# Patient Record
Sex: MALE | Race: WHITE | Employment: UNEMPLOYED | URBAN - METROPOLITAN AREA
[De-identification: names, ages, dates, MRNs, and addresses within clinical notes are randomized per-mention and may not be internally consistent; named-entity substitution may affect disease eponyms.]

---

## 2017-08-18 ENCOUNTER — TELEPHONE (OUTPATIENT)
Dept: CARDIOLOGY CLINIC | Age: 67
End: 2017-08-18

## 2017-11-01 LAB
ALBUMIN SERPL-MCNC: 4.2 G/DL
ALP BLD-CCNC: 47 U/L
ALT SERPL-CCNC: 22 U/L
ANION GAP SERPL CALCULATED.3IONS-SCNC: NORMAL MMOL/L
AST SERPL-CCNC: 21 U/L
BASOPHILS ABSOLUTE: 0.04 /ΜL
BASOPHILS RELATIVE PERCENT: 1 %
BILIRUB SERPL-MCNC: 0.6 MG/DL (ref 0.1–1.4)
BUN BLDV-MCNC: 20 MG/DL
CALCIUM SERPL-MCNC: 9.5 MG/DL
CHLORIDE BLD-SCNC: 102 MMOL/L
CHOLESTEROL, TOTAL: 196 MG/DL
CHOLESTEROL/HDL RATIO: 4.4
CO2: 27 MMOL/L
CREAT SERPL-MCNC: 0.83 MG/DL
EOSINOPHILS ABSOLUTE: 0.14 /ΜL
EOSINOPHILS RELATIVE PERCENT: 2 %
GFR CALCULATED: NORMAL
GLUCOSE BLD-MCNC: 108 MG/DL
HCT VFR BLD CALC: 44 % (ref 41–53)
HDLC SERPL-MCNC: 45 MG/DL (ref 35–70)
HEMOGLOBIN: 14.7 G/DL (ref 13.5–17.5)
LDL CHOLESTEROL CALCULATED: 103 MG/DL (ref 0–160)
LYMPHOCYTES ABSOLUTE: 1.54 /ΜL
LYMPHOCYTES RELATIVE PERCENT: 21 %
MCH RBC QN AUTO: 31 PG
MCHC RBC AUTO-ENTMCNC: 34 G/DL
MCV RBC AUTO: 92 FL
MONOCYTES ABSOLUTE: 0.48 /ΜL
MONOCYTES RELATIVE PERCENT: 7 %
NEUTROPHILS ABSOLUTE: 5.18 /ΜL
NEUTROPHILS RELATIVE PERCENT: NORMAL %
PLATELET # BLD: 195 K/ΜL
PMV BLD AUTO: NORMAL FL
POTASSIUM SERPL-SCNC: 4.4 MMOL/L
RBC # BLD: 4.8 10^6/ΜL
SODIUM BLD-SCNC: 138 MMOL/L
TOTAL PROTEIN: 6.6
TRIGL SERPL-MCNC: 242 MG/DL
VLDLC SERPL CALC-MCNC: NORMAL MG/DL
WBC # BLD: 7.4 10^3/ML

## 2017-12-19 ENCOUNTER — OFFICE VISIT (OUTPATIENT)
Dept: CARDIOLOGY CLINIC | Age: 67
End: 2017-12-19

## 2017-12-19 VITALS
WEIGHT: 315 LBS | BODY MASS INDEX: 42.66 KG/M2 | SYSTOLIC BLOOD PRESSURE: 134 MMHG | DIASTOLIC BLOOD PRESSURE: 70 MMHG | OXYGEN SATURATION: 96 % | HEART RATE: 69 BPM | HEIGHT: 72 IN

## 2017-12-19 DIAGNOSIS — I10 ESSENTIAL HYPERTENSION: ICD-10-CM

## 2017-12-19 DIAGNOSIS — I48.3 TYPICAL ATRIAL FLUTTER (HCC): Primary | ICD-10-CM

## 2017-12-19 DIAGNOSIS — E78.2 MIXED HYPERLIPIDEMIA: ICD-10-CM

## 2017-12-19 PROCEDURE — 1036F TOBACCO NON-USER: CPT | Performed by: INTERNAL MEDICINE

## 2017-12-19 PROCEDURE — 1123F ACP DISCUSS/DSCN MKR DOCD: CPT | Performed by: INTERNAL MEDICINE

## 2017-12-19 PROCEDURE — 99214 OFFICE O/P EST MOD 30 MIN: CPT | Performed by: INTERNAL MEDICINE

## 2017-12-19 PROCEDURE — G8417 CALC BMI ABV UP PARAM F/U: HCPCS | Performed by: INTERNAL MEDICINE

## 2017-12-19 PROCEDURE — 3017F COLORECTAL CA SCREEN DOC REV: CPT | Performed by: INTERNAL MEDICINE

## 2017-12-19 PROCEDURE — 4040F PNEUMOC VAC/ADMIN/RCVD: CPT | Performed by: INTERNAL MEDICINE

## 2017-12-19 PROCEDURE — G8484 FLU IMMUNIZE NO ADMIN: HCPCS | Performed by: INTERNAL MEDICINE

## 2017-12-19 PROCEDURE — G8427 DOCREV CUR MEDS BY ELIG CLIN: HCPCS | Performed by: INTERNAL MEDICINE

## 2017-12-19 NOTE — LETTER
[address]  [address] [facility]  [address] [address]  Phone: [phone]  Fax: [phone]    [facility] [place], MD        December 19, 2017     [address]  [address] [facility] [place] [address]    Patient: Murphy Celis  MR Number: [id number]  YOB: 1950  Date of Visit: 12/19/2017    Dear Dr. Kumar Row:    Thank you for the request for consultation for Murphy Celis to me for the evaluation. Below are the relevant portions of my assessment and plan of care. [facility] Office Note  12/19/2017     Subjective:  Mr. Rissa Concepcion is here for follow up atrial flutter chronic and persistent treated with rate control and coumadin. Today he reports he feels well overall from a cardiac standpoint. He states he has had vertigo for the past 2 weeks upon awakening in the morning. He gets up and moving and vertigo will resolve. No associated symptoms. He has had some inner ear fluid. He denies CP, SOB,   or syncope. HPI:   Murpyh Celis is a 79 y.o. male who presents for follow up. On 5/7/15 he was here in f/u. He recently had a 2 week event monitor ordered by Dr Alba Noble due to feeling palpitations after ablation. He stated when he saw Dr Alba Noble last he had a couple of episodes but related to eating a heavy meal and increase alcohol . He wore a monitor and on Sept 3 he had 1/2 hr of A fib rate 117 and he was aware in. It lasted about half hr.  He denied chest pain, change in breathing or dizziness. EKG on 2/24/15 showed a flutter. He underwent successful Aflutter ablation on 05/01/2015. He is followed by Dr Alba Noble. Review of Systems:   12 point ROS negative in all areas as listed below except as in [place]  Constitutional, EENT, Cardiovascular, pulmonary, GI, , Musculoskeletal, skin, neurological, hematological, endocrine, Psychiatric    Reviewed past medical history, social, and family history. He stopped smoking 6 months ago. He drinks 2 alcoholic beverages during week and 6-8 over the weekend. Past Medical History:   Diagnosis Date    Arthritis     feet    Atrial flutter (HCC)     Bradley's esophagus     Colon polyp     GERD (gastroesophageal reflux disease)     Hiatal hernia     Hyperlipidemia      Past Surgical History:   Procedure Laterality Date    COLONOSCOPY      x 5 since age 50    COLONOSCOPY  4/9/2012    COLONOSCOPY  09/2017    KNEE CARTILAGE SURGERY      left     OTHER SURGICAL HISTORY  4/9/12    barretts       Objective:   /70   Pulse 69   Ht 6' (1.829 m)   Wt (!) 350 lb (158.8 kg)   SpO2 96%   BMI 47.47 kg/m²      Wt Readings from Last 3 Encounters:   12/19/17 (!) 350 lb (158.8 kg)   11/21/16 (!) 361 lb 3.2 oz (163.8 kg)   05/25/16 (!) 340 lb (154.2 kg)       Physical Exam:  General: No Respiratory distress, appears well developed and well nourished. Eyes:  Sclera nonicteric  Nose/Sinuses:  negative findings: nose shows no deformity, asymmetry, or inflammation, nasal mucosa normal, septum midline with no perforation or bleeding  Back:  no pain to palpation  Joint:  no active joint inflammation  Musculoskeletal:  negative  Skin:  Warm and dry  Neck:  Negative for JVD and Carotid Bruits. Chest:  Clear to auscultation, respiration easy  Cardiovascular:  (S/P ablation 2015) regular R & R S2 normal, no murmur, no rub or thrill.    Extremities:   1+ bilateral leg edema, clubbing, cyanosis,  Pulses: Femoral and pedal pulses are normal.  Neuro: intact    Medications:   Outpatient Encounter Prescriptions as of 12/19/2017   Medication Sig Dispense Refill    diltiazem (CARDIZEM SR) 120 MG SR capsule Take 120 mg by mouth 2 times daily      vitamin D (CHOLECALCIFEROL) 400 UNITS TABS tablet Take 400 Units by mouth daily      metoprolol (LOPRESSOR) 50 MG tablet TAKE ONE-HALF TABLET BY MOUTH TWICE DAILY 30 tablet 5

## 2017-12-19 NOTE — PROGRESS NOTES
[address] Office Note  12/19/2017     Subjective:  Mr. Wayne Zelaya is here for follow up atrial flutter chronic and persistent treated with rate control and coumadin. Today he reports he feels well overall from a cardiac standpoint. He states he has had vertigo for the past 2 weeks upon awakening in the morning. He gets up and moving and vertigo will resolve. No associated symptoms. He has had some inner ear fluid. He denies CP, SOB,   or syncope. HPI:   Jannelle Kocher is a 79 y.o. male who presents for follow up. On 5/7/15 he was here in f/u. He recently had a 2 week event monitor ordered by Dr Delilah Dominguez due to feeling palpitations after ablation. He stated when he saw Dr Delilah Dominguez last he had a couple of episodes but related to eating a heavy meal and increase alcohol . He wore a monitor and on Sept 3 he had 1/2 hr of A fib rate 117 and he was aware in. It lasted about half hr.  He denied chest pain, change in breathing or dizziness. EKG on 2/24/15 showed a flutter. He underwent successful Aflutter ablation on 05/01/2015. He is followed by Dr Delilah Dominguez. Review of Systems:   12 point ROS negative in all areas as listed below except as in [place]  Constitutional, EENT, Cardiovascular, pulmonary, GI, , Musculoskeletal, skin, neurological, hematological, endocrine, Psychiatric    Reviewed past medical history, social, and family history. He stopped smoking 6 months ago. He drinks 2 alcoholic beverages during week and 6-8 over the weekend.       Past Medical History:   Diagnosis Date    Arthritis     feet    Atrial flutter ([address]     Bradley's esophagus     Colon polyp     GERD (gastroesophageal reflux disease)     Hiatal hernia     Hyperlipidemia      Past Surgical History:   Procedure Laterality Date    COLONOSCOPY      x 5 since age 50    COLONOSCOPY  4/9/2012    COLONOSCOPY  09/2017    KNEE CARTILAGE SURGERY      left     OTHER SURGICAL HISTORY  4/9/12    barretts Objective:   /70   Pulse 69   Ht 6' (1.829 m)   Wt (!) 350 lb (158.8 kg)   SpO2 96%   BMI 47.47 kg/m²     Wt Readings from Last 3 Encounters:   12/19/17 (!) 350 lb (158.8 kg)   11/21/16 (!) 361 lb 3.2 oz (163.8 kg)   05/25/16 (!) 340 lb (154.2 kg)       Physical Exam:  General: No Respiratory distress, appears well developed and well nourished. Eyes:  Sclera nonicteric  Nose/Sinuses:  negative findings: nose shows no deformity, asymmetry, or inflammation, nasal mucosa normal, septum midline with no perforation or bleeding  Back:  no pain to palpation  Joint:  no active joint inflammation  Musculoskeletal:  negative  Skin:  Warm and dry  Neck:  Negative for JVD and Carotid Bruits. Chest:  Clear to auscultation, respiration easy  Cardiovascular:  (S/P ablation 2015) regular R & R S2 normal, no murmur, no rub or thrill. Extremities:   1+ bilateral leg edema, clubbing, cyanosis,  Pulses: Femoral and pedal pulses are normal.  Neuro: intact    Medications:   Outpatient Encounter Prescriptions as of 12/19/2017   Medication Sig Dispense Refill    diltiazem (CARDIZEM SR) 120 MG SR capsule Take 120 mg by mouth 2 times daily      vitamin D (CHOLECALCIFEROL) 400 UNITS TABS tablet Take 400 Units by mouth daily      metoprolol (LOPRESSOR) 50 MG tablet TAKE ONE-HALF TABLET BY MOUTH TWICE DAILY 30 tablet 5    warfarin (COUMADIN) 2 MG tablet TAKE THREE AND ONE-HALF TABLETS BY MOUTH EVERY DAY OR AS DIRECTED (Patient taking differently: 7 mg TAKE THREE AND ONE-HALF TABLETS BY MOUTH EVERY DAY OR AS DIRECTED) 360 tablet 1    esomeprazole Magnesium (NEXIUM) 20 MG PACK Take 20 mg by mouth daily. OTC      atorvastatin (LIPITOR) 10 MG tablet Take 1 tablet by mouth daily. 30 tablet 11    Multiple Vitamins-Minerals (THERAPEUTIC MULTIVITAMIN-MINERALS) tablet Take 1 tablet by mouth daily       No facility-administered encounter medications on file as of 12/19/2017.          Lab Data:  CBC: No results for input(s): WBC, HGB, HCT, MCV, PLT in the last 72 hours. BMP: No results for input(s): NA, K, CL, CO2, PHOS, BUN, CREATININE in the last 72 hours. Invalid input(s): CA  LIVER PROFILE: No results for input(s): AST, ALT, LIPASE, BILIDIR, BILITOT, ALKPHOS in the last 72 hours. Invalid input(s): AMYLASE,  ALB  LIPID:   TC   11/1/17   196  Lab Results   Component Value Date    TRIG 242 11/01/2017    TRIG 250 (H) 04/29/2013     Lab Results   Component Value Date    HDL 45 11/01/2017    HDL 48 04/29/2013     Lab Results   Component Value Date    LDLCALC 103 11/01/2017    LDLCALC 117 (H) 04/29/2013     Lab Results   Component Value Date    LABVLDL 50 04/29/2013     PT/INR: No results for input(s): PROTIME, INR in the last 72 hours. A1C:   No results found for: LABA1C  BNP:  No results for input(s): BNP in the last 72 hours. IMAGING:     Assessment:  Atrial Fibrillation  Event recorder showed 30 min A fib on Sept 3  He is on coumadin. Monitored by PCP. S/p ablation of atrialflutter  Hypertension   /70   Pulse 69   Ht 6' (1.829 m)   Wt (!) 350 lb (158.8 kg)   SpO2 96%   BMI 47.47 kg/m²     Hyperlipidemia Last lipids 11/1/17TC 196   HDL 45     Plan:  1. Encourage daily activity and healthy eating. We talked about calorie restriction and weight management. He is not motivated to exercise. 2.  Continue current medication  3. Follow up with me in 1 year      QUALITY MEASURES  1. Tobacco Cessation Counseling: NA  2. Retake of BP if >140/90:   NA  3. Documentation to PCP/referring for new patient:  Sent to PCP at close of office visit  4. CAD patient on anti-platelet: NA  5. CAD patient on STATIN therapy:  Yes  6.  Patient with CHF and aFib on anticoagulation:  Yes     Monthly INR being monitored by Dr Mira Santiago from [address] [place] [address]    Dr. Darryl Fabian MD 12/19/2017 3:36 PM

## 2017-12-19 NOTE — COMMUNICATION BODY
[address] Office Note  12/19/2017     Subjective:  Mr. Eleanor French is here for follow up atrial flutter chronic and persistent treated with rate control and coumadin. Today he reports he feels well overall from a cardiac standpoint. He states he has had vertigo for the past 2 weeks upon awakening in the morning. He gets up and moving and vertigo will resolve. No associated symptoms. He has had some inner ear fluid. He denies CP, SOB,   or syncope. HPI:   Harris Gomes is a 79 y.o. male who presents for follow up. On 5/7/15 he was here in f/u. He recently had a 2 week event monitor ordered by Dr Thad Chirinos due to feeling palpitations after ablation. He stated when he saw Dr Thad Chirinos last he had a couple of episodes but related to eating a heavy meal and increase alcohol . He wore a monitor and on Sept 3 he had 1/2 hr of A fib rate 117 and he was aware in. It lasted about half hr.  He denied chest pain, change in breathing or dizziness. EKG on 2/24/15 showed a flutter. He underwent successful Aflutter ablation on 05/01/2015. He is followed by Dr Tahd Chirinos. Review of Systems:   12 point ROS negative in all areas as listed below except as in [place]  Constitutional, EENT, Cardiovascular, pulmonary, GI, , Musculoskeletal, skin, neurological, hematological, endocrine, Psychiatric    Reviewed past medical history, social, and family history. He stopped smoking 6 months ago. He drinks 2 alcoholic beverages during week and 6-8 over the weekend.       Past Medical History:   Diagnosis Date    Arthritis     feet    Atrial flutter ([address]     Bradley's esophagus     Colon polyp     GERD (gastroesophageal reflux disease)     Hiatal hernia     Hyperlipidemia      Past Surgical History:   Procedure Laterality Date    COLONOSCOPY      x 5 since age 50    COLONOSCOPY  4/9/2012    COLONOSCOPY  09/2017    KNEE CARTILAGE SURGERY      left     OTHER SURGICAL HISTORY  4/9/12    barretts

## 2018-11-15 ENCOUNTER — OFFICE VISIT (OUTPATIENT)
Dept: CARDIOLOGY CLINIC | Age: 68
End: 2018-11-15
Payer: MEDICARE

## 2018-11-15 VITALS
OXYGEN SATURATION: 95 % | WEIGHT: 315 LBS | HEIGHT: 72 IN | SYSTOLIC BLOOD PRESSURE: 112 MMHG | HEART RATE: 65 BPM | BODY MASS INDEX: 42.66 KG/M2 | DIASTOLIC BLOOD PRESSURE: 68 MMHG

## 2018-11-15 DIAGNOSIS — I48.3 TYPICAL ATRIAL FLUTTER (HCC): ICD-10-CM

## 2018-11-15 DIAGNOSIS — I10 ESSENTIAL HYPERTENSION: Primary | ICD-10-CM

## 2018-11-15 DIAGNOSIS — E78.2 MIXED HYPERLIPIDEMIA: ICD-10-CM

## 2018-11-15 PROCEDURE — G8427 DOCREV CUR MEDS BY ELIG CLIN: HCPCS | Performed by: INTERNAL MEDICINE

## 2018-11-15 PROCEDURE — 4040F PNEUMOC VAC/ADMIN/RCVD: CPT | Performed by: INTERNAL MEDICINE

## 2018-11-15 PROCEDURE — 1123F ACP DISCUSS/DSCN MKR DOCD: CPT | Performed by: INTERNAL MEDICINE

## 2018-11-15 PROCEDURE — 1036F TOBACCO NON-USER: CPT | Performed by: INTERNAL MEDICINE

## 2018-11-15 PROCEDURE — G8417 CALC BMI ABV UP PARAM F/U: HCPCS | Performed by: INTERNAL MEDICINE

## 2018-11-15 PROCEDURE — 1101F PT FALLS ASSESS-DOCD LE1/YR: CPT | Performed by: INTERNAL MEDICINE

## 2018-11-15 PROCEDURE — 3017F COLORECTAL CA SCREEN DOC REV: CPT | Performed by: INTERNAL MEDICINE

## 2018-11-15 PROCEDURE — 99214 OFFICE O/P EST MOD 30 MIN: CPT | Performed by: INTERNAL MEDICINE

## 2018-11-15 PROCEDURE — G8484 FLU IMMUNIZE NO ADMIN: HCPCS | Performed by: INTERNAL MEDICINE

## 2018-11-15 NOTE — PROGRESS NOTES
Data:  CBC: No results for input(s): WBC, HGB, HCT, MCV, PLT in the last 72 hours. BMP: No results for input(s): NA, K, CL, CO2, PHOS, BUN, CREATININE in the last 72 hours. Invalid input(s): CA  LIVER PROFILE: No results for input(s): AST, ALT, LIPASE, BILIDIR, BILITOT, ALKPHOS in the last 72 hours. Invalid input(s): AMYLASE,  ALB  LIPID:   TC   11/1/17   196  Lab Results   Component Value Date    TRIG 242 11/01/2017    TRIG 250 (H) 04/29/2013     Lab Results   Component Value Date    HDL 45 11/01/2017    HDL 48 04/29/2013     Lab Results   Component Value Date    LDLCALC 103 11/01/2017    LDLCALC 117 (H) 04/29/2013     Lab Results   Component Value Date    LABVLDL 50 04/29/2013     PT/INR: No results for input(s): PROTIME, INR in the last 72 hours. A1C:   No results found for: LABA1C  BNP:  No results for input(s): BNP in the last 72 hours. IMAGING:     Assessment:  Atrial Fibrillation by History currently regular  He is on coumadin. Monitored by PCP Dr Liyah Thomason. S/p ablation of atrialflutter  Hypertension   /68   Pulse 65   Ht 6' (1.829 m)   Wt (!) 351 lb (159.2 kg)   SpO2 95%   BMI 47.60 kg/m²     Hyperlipidemia Last lipids 8/2017 reviewed in care everywhere  All recent labs reviewed from August 2018 in [facility]:  1. Encourage daily activity and healthy eating. We talked about calorie restriction and weight management. He is not motivated to exercise. 2.  Continue current medication all meds reviewed and refilled as warranted  3. Follow up with me in 1 year  His weight is due to excessive calorie intake mainly alcohol. QUALITY MEASURES  1. Tobacco Cessation Counseling: NA  2. Retake of BP if >140/90:   NA  3. Documentation to PCP/referring for new patient:  Sent to PCP at close of office visit  4. CAD patient on anti-platelet: NA  5. CAD patient on STATIN therapy:  Yes  6.  Patient with CHF and aFib on anticoagulation:  Yes     Monthly INR being monitored by Dr Liyah Thomason from

## 2019-11-12 ENCOUNTER — OFFICE VISIT (OUTPATIENT)
Dept: CARDIOLOGY CLINIC | Age: 69
End: 2019-11-12
Payer: MEDICARE

## 2019-11-12 VITALS
DIASTOLIC BLOOD PRESSURE: 66 MMHG | SYSTOLIC BLOOD PRESSURE: 124 MMHG | OXYGEN SATURATION: 95 % | HEART RATE: 69 BPM | BODY MASS INDEX: 42.66 KG/M2 | WEIGHT: 315 LBS | HEIGHT: 72 IN

## 2019-11-12 DIAGNOSIS — I48.0 PAF (PAROXYSMAL ATRIAL FIBRILLATION) (HCC): Primary | ICD-10-CM

## 2019-11-12 DIAGNOSIS — I10 ESSENTIAL HYPERTENSION: ICD-10-CM

## 2019-11-12 DIAGNOSIS — I48.3 TYPICAL ATRIAL FLUTTER (HCC): ICD-10-CM

## 2019-11-12 DIAGNOSIS — E78.2 MIXED HYPERLIPIDEMIA: ICD-10-CM

## 2019-11-12 PROCEDURE — 99214 OFFICE O/P EST MOD 30 MIN: CPT | Performed by: INTERNAL MEDICINE

## 2019-11-12 PROCEDURE — G8427 DOCREV CUR MEDS BY ELIG CLIN: HCPCS | Performed by: INTERNAL MEDICINE

## 2019-11-12 PROCEDURE — 1036F TOBACCO NON-USER: CPT | Performed by: INTERNAL MEDICINE

## 2019-11-12 PROCEDURE — 3017F COLORECTAL CA SCREEN DOC REV: CPT | Performed by: INTERNAL MEDICINE

## 2019-11-12 PROCEDURE — 4040F PNEUMOC VAC/ADMIN/RCVD: CPT | Performed by: INTERNAL MEDICINE

## 2019-11-12 PROCEDURE — 1123F ACP DISCUSS/DSCN MKR DOCD: CPT | Performed by: INTERNAL MEDICINE

## 2019-11-12 PROCEDURE — G8417 CALC BMI ABV UP PARAM F/U: HCPCS | Performed by: INTERNAL MEDICINE

## 2019-11-12 PROCEDURE — G8484 FLU IMMUNIZE NO ADMIN: HCPCS | Performed by: INTERNAL MEDICINE

## 2020-01-17 ENCOUNTER — TELEPHONE (OUTPATIENT)
Dept: CARDIOLOGY CLINIC | Age: 70
End: 2020-01-17

## 2020-11-23 ENCOUNTER — OFFICE VISIT (OUTPATIENT)
Dept: CARDIOLOGY CLINIC | Age: 70
End: 2020-11-23
Payer: MEDICARE

## 2020-11-23 VITALS
SYSTOLIC BLOOD PRESSURE: 130 MMHG | HEART RATE: 67 BPM | TEMPERATURE: 97.1 F | DIASTOLIC BLOOD PRESSURE: 70 MMHG | HEIGHT: 73 IN | WEIGHT: 315 LBS | BODY MASS INDEX: 41.75 KG/M2 | OXYGEN SATURATION: 97 %

## 2020-11-23 PROCEDURE — 3017F COLORECTAL CA SCREEN DOC REV: CPT | Performed by: INTERNAL MEDICINE

## 2020-11-23 PROCEDURE — G8427 DOCREV CUR MEDS BY ELIG CLIN: HCPCS | Performed by: INTERNAL MEDICINE

## 2020-11-23 PROCEDURE — 1036F TOBACCO NON-USER: CPT | Performed by: INTERNAL MEDICINE

## 2020-11-23 PROCEDURE — 99214 OFFICE O/P EST MOD 30 MIN: CPT | Performed by: INTERNAL MEDICINE

## 2020-11-23 PROCEDURE — 4040F PNEUMOC VAC/ADMIN/RCVD: CPT | Performed by: INTERNAL MEDICINE

## 2020-11-23 PROCEDURE — G8417 CALC BMI ABV UP PARAM F/U: HCPCS | Performed by: INTERNAL MEDICINE

## 2020-11-23 PROCEDURE — G8484 FLU IMMUNIZE NO ADMIN: HCPCS | Performed by: INTERNAL MEDICINE

## 2020-11-23 PROCEDURE — 1123F ACP DISCUSS/DSCN MKR DOCD: CPT | Performed by: INTERNAL MEDICINE

## 2020-11-23 RX ORDER — VITAMIN E 268 MG
400 CAPSULE ORAL DAILY
COMMUNITY

## 2020-11-23 NOTE — PATIENT INSTRUCTIONS
Plan:  1. Meds reviewed. Refills with PCP per pt request.   2. No changes today. Continue current meds   3.  Follow up with me in 1 year

## 2020-11-23 NOTE — PROGRESS NOTES
[address] Office Note  2020     Subjective:  Mr. oJcelin Esposito is here for follow up atrial flutter s/p ablation,  parox afib. on  coumadin. HPI:   Today he denies chest pain, shortness of breath, edema, dizziness, palpitations and syncope. He is exercising daily riding bike 30 minutes daily without complication or limiting factors. PMH:  Zion Adkins is a 79 y.o. male who presents for follow up. On 5/7/15 he was here in f/u. He recently had a 2 week event monitor ordered by Dr Alexandro Cruz due to feeling palpitations after ablation. He stated when he saw Dr Alexandro Cruz last he had a couple of episodes but related to eating a heavy meal and increase alcohol . He wore a monitor and on Sept 3 he had 1/2 hr of A fib rate 117 and he was aware in. It lasted about half hr.  He denied chest pain, change in breathing or dizziness. EKG on 2/24/15 showed a flutter. He underwent successful Aflutter ablation on 2015. He is followed by Dr Alexandro Cruz. Review of Systems:   12 point ROS negative in all areas as listed below except as in [place]  Constitutional, EENT, pulmonary, GI, , Musculoskeletal, skin, neurological, hematological, endocrine, Psychiatric    Reviewed past medical history, social, and family history. He stopped smoking 2017. He drinks 2 alcoholic beverages few shots 3-4 nights per week maybe more over the weekend.     Mom smoker  age 76 CHF  Dad  Smoker  age 67 esophageal cancer, aspirated  Past Medical History:   Diagnosis Date    Arthritis     feet    Atrial flutter ([address]     Bradley's esophagus     Colon polyp     GERD (gastroesophageal reflux disease)     Hiatal hernia     Hyperlipidemia      Past Surgical History:   Procedure Laterality Date    COLONOSCOPY      x 5 since age 50    COLONOSCOPY  2012    COLONOSCOPY  2017    KNEE CARTILAGE SURGERY      left     OTHER SURGICAL HISTORY  12    barretts       Objective:   /70   Pulse 67   Temp 97.1 °F (36.2 °C)   Ht 6' 1\" (1.854 m)   Wt (!) 330 lb 1.9 oz (149.7 kg)   SpO2 97%   BMI 43.55 kg/m²     Wt Readings from Last 3 Encounters:   11/23/20 (!) 330 lb 1.9 oz (149.7 kg)   11/12/19 (!) 334 lb (151.5 kg)   11/15/18 (!) 351 lb (159.2 kg)       Physical Exam:  General: No Respiratory distress, appears well developed and well nourished. Eyes:  Sclera nonicteric  Nose/Sinuses:  negative findings: nose shows no deformity, asymmetry, or inflammation, nasal mucosa normal, septum midline with no perforation or bleeding  Back:  no pain to palpation  Joint:  no active joint inflammation  Musculoskeletal:  negative  Skin:  Warm and dry  Neck:  Negative for JVD and Carotid Bruits. Chest:  Clear to auscultation, respiration easy  Cardiovascular:  HR 64 bpm, (S/P ablation 2015) regular R & R S2 normal, no murmur, no rub or thrill. Extremities:  1+ BLE edema,  No clubbing, cyanosis,  Pulses:  pedal pulses are normal.  Neuro: intact    Medications:   Outpatient Encounter Medications as of 11/23/2020   Medication Sig Dispense Refill    vitamin E 400 UNIT capsule Take 400 Units by mouth daily      Inulin (FIBER CHOICE PO) Take by mouth daily      diltiazem (CARDIZEM SR) 120 MG SR capsule Take 120 mg by mouth 2 times daily      vitamin D (CHOLECALCIFEROL) 400 UNITS TABS tablet Take 400 Units by mouth daily      metoprolol (LOPRESSOR) 50 MG tablet TAKE ONE-HALF TABLET BY MOUTH TWICE DAILY 30 tablet 5    warfarin (COUMADIN) 2 MG tablet TAKE THREE AND ONE-HALF TABLETS BY MOUTH EVERY DAY OR AS DIRECTED (Patient taking differently: 7 mg TAKE THREE AND ONE-HALF TABLETS BY MOUTH EVERY DAY OR AS DIRECTED) 360 tablet 1    esomeprazole Magnesium (NEXIUM) 20 MG PACK Take 20 mg by mouth 2 times daily       atorvastatin (LIPITOR) 10 MG tablet Take 1 tablet by mouth daily. 30 tablet 11     No facility-administered encounter medications on file as of 11/23/2020.          Lab Data:  Recent Results noted in care everywhere  Lab Results   Component Value Date    TRIG 242 11/01/2017    TRIG 250 (H) 04/29/2013     Lab Results   Component Value Date    HDL 45 11/01/2017    HDL 48 04/29/2013     Lab Results   Component Value Date    LDLCALC 103 11/01/2017    LDLCALC 117 (H) 04/29/2013     IMAGING:     Assessment:  Encounter Diagnoses   Name Primary?  Typical atrial flutter (HCC) Yes    Essential hypertension        Atrial Fibrillation by History currently regular  He is on coumadin. Monitored by PCP Dr Rasheed Armenta. S/p ablation of atrialflutter  /70   Pulse 67   Temp 97.1 °F (36.2 °C)   Ht 6' 1\" (1.854 m)   Wt (!) 330 lb 1.9 oz (149.7 kg)   SpO2 97%   BMI 43.55 kg/m²     Hyperlipidemia Last lipids 7/8/20 reviewed in care everywhere , , HDL 42, LDL 88    Plan:  1. Meds reviewed. Refills with PCP per pt request.   2. No changes today. Continue current meds   3. Follow up with me in 1 year   4. I reviewed labs in epic chart. Look satisfactory. This note was scribed in the presence of Shavonne Bradshaw MD by Rubin Huerta RN      QUALITY MEASURES  1. Tobacco Cessation Counseling: NA  2. Retake of BP if >140/90:   NA  3. Documentation to PCP/referring for new patient:  Sent to PCP at close of office visit  4. CAD patient on anti-platelet: NA  5. CAD patient on STATIN therapy:  Yes  6. Patient with CHF and aFib on anticoagulation:  Yes     Monthly INR being monitored by Dr Rasheed Armenta from [facility], Dr. Emeka Johnson, personally performed the services described in this documentation, as scribed by the above signed scribe in my presence. It is both accurate and complete to my knowledge. I agree with the details independently gathered by the clinical support staff, while the remaining scribed note accurately describes my personal service to the patient.           Dr. Emeka Johnson MD 11/23/2020 2:00 PM

## 2021-11-18 NOTE — PROGRESS NOTES
[address] Office Note  2021     Subjective:  Mr. Desire Araujo is here for follow up atrial flutter s/p ablation,  parox afib. on  coumadin. HPI:   Today, he reports he is having more problems with hiatal hernia. He describes it as a squiggly feeling in his chest.    Patient denies chest pain, sob, palpitations, dizziness or syncope. Patient states he is taking all medications as prescribed. Patient states he has received both COVID vaccines and the booster- Clarene Liliane        PMH:  Rafael Lea is a 70 y.o. male who presents for follow up. On 5/7/15 he was here in f/u. He recently had a 2 week event monitor ordered by Dr Dionna De Los Santos due to feeling palpitations after ablation. He stated when he saw Dr Dionna De Los Santos last he had a couple of episodes but related to eating a heavy meal and increase alcohol . He wore a monitor and on Sept 3 he had 1/2 hr of A fib rate 117 and he was aware in. It lasted about half hr.  He denied chest pain, change in breathing or dizziness. EKG on 2/24/15 showed a flutter. He underwent successful Aflutter ablation on 2015. He is followed by Dr Dionna De Los Santos. Review of Systems:   12 point ROS negative in all areas as listed below except as in [place]  Constitutional, EENT, pulmonary, GI, , Musculoskeletal, skin, neurological, hematological, endocrine, Psychiatric    Reviewed past medical history, social, and family history. He stopped smoking 2017. He drinks 2 alcoholic beverages few shots 3-4 nights per week maybe more over the weekend.     Mom smoker  age 76 CHF  Dad  Smoker  age 67 esophageal cancer, aspirated  Past Medical History:   Diagnosis Date    Arthritis     feet    Atrial flutter ([address]     Bradley's esophagus     Colon polyp     GERD (gastroesophageal reflux disease)     Hiatal hernia     Hyperlipidemia      Past Surgical History:   Procedure Laterality Date    COLONOSCOPY      x 5 since age 50    COLONOSCOPY  2012    COLONOSCOPY 09/2017    KNEE CARTILAGE SURGERY      left     OTHER SURGICAL HISTORY  4/9/12    stormy       Objective:   /62   Pulse 56   Ht 6' 1\" (1.854 m)   Wt (!) 342 lb (155.1 kg)   SpO2 (!) 67%   BMI 45.12 kg/m²     Wt Readings from Last 3 Encounters:   11/22/21 (!) 342 lb (155.1 kg)   11/23/20 (!) 330 lb 1.9 oz (149.7 kg)   11/12/19 (!) 334 lb (151.5 kg)       Physical Exam:  General: No Respiratory distress, appears well developed and well nourished. Eyes:  Sclera nonicteric  Nose/Sinuses:  negative findings: nose shows no deformity, asymmetry, or inflammation, nasal mucosa normal, septum midline with no perforation or bleeding  Back:  no pain to palpation  Joint:  no active joint inflammation  Musculoskeletal:  negative  Skin:  Warm and dry  Neck:  Negative for JVD and Carotid Bruits. Chest:  Clear to auscultation, respiration easy  Cardiovascular:  (S/P ablation 2015) regular R & R S2 normal, no murmur, no rub or thrill.    Extremities:  No edema,  No clubbing, cyanosis,  Pulses:  pedal pulses are normal.  Neuro: intact    Medications:   Outpatient Encounter Medications as of 11/22/2021   Medication Sig Dispense Refill    finasteride (PROSCAR) 5 MG tablet Take 5 mg by mouth daily      tamsulosin (FLOMAX) 0.4 MG capsule TAKE 1 CAPSULE EVERY DAY      Inulin (FIBER CHOICE PO) Take by mouth daily      diltiazem (CARDIZEM SR) 120 MG SR capsule Take 120 mg by mouth 2 times daily      vitamin D (CHOLECALCIFEROL) 400 UNITS TABS tablet Take 400 Units by mouth daily      metoprolol (LOPRESSOR) 50 MG tablet TAKE ONE-HALF TABLET BY MOUTH TWICE DAILY 30 tablet 5    warfarin (COUMADIN) 2 MG tablet TAKE THREE AND ONE-HALF TABLETS BY MOUTH EVERY DAY OR AS DIRECTED (Patient taking differently: 7 mg TAKE THREE AND ONE-HALF TABLETS BY MOUTH EVERY DAY OR AS DIRECTED) 360 tablet 1    esomeprazole Magnesium (NEXIUM) 20 MG PACK Take 20 mg by mouth 2 times daily       atorvastatin (LIPITOR) 10 MG tablet Take 1 tablet by mouth daily. 30 tablet 11    vitamin E 400 UNIT capsule Take 400 Units by mouth daily (Patient not taking: Reported on 11/22/2021)       No facility-administered encounter medications on file as of 11/22/2021. Lab Data:  Recent Results noted in care everywhere  Lab Results   Component Value Date    TRIG 242 11/01/2017    TRIG 250 (H) 04/29/2013     Lab Results   Component Value Date    HDL 45 11/01/2017    HDL 48 04/29/2013     Lab Results   Component Value Date    LDLCALC 103 11/01/2017    LDLCALC 117 (H) 04/29/2013     IMAGING:     Assessment:  Encounter Diagnoses   Name Primary?  Typical atrial flutter (HCC)     Mixed hyperlipidemia     Primary hypertension     Paroxysmal atrial fibrillation (HCC) Yes       Atrial Fibrillation by History currently regular  He is on coumadin. Monitored by PCP at [facility] . S/p ablation of atrialflutter  /62   Pulse 56   Ht 6' 1\" (1.854 m)   Wt (!) 342 lb (155.1 kg)   SpO2 (!) 67%   BMI 45.12 kg/m²     Hyperlipidemia Last lipids 7/2/2021 reviewed in care everywhere ,  CHOL 167, HDL 52, LDL 93,   LABS: TSH 1.99, H/H 13.6/42.8, , K+ 4.1, BUN 15, Cr 0.88, AST 16, ALT 17    Plan:  1. Meds reviewed. Refills with PCP per pt request.   2. Recommend to get new blood pressure cuff ([place])   -buy one with a large cuff, should cover 80% of arms  3. Check heart rate  4. Follow up in 1 year        QUALITY MEASURES  1. Tobacco Cessation Counseling: NA  2. Retake of BP if >140/90:   NA  3. Documentation to PCP/referring for new patient:  Sent to PCP at close of office visit  4. CAD patient on anti-platelet: NA  5. CAD patient on STATIN therapy:  Yes  6. Patient with CHF and aFib on anticoagulation:  Yes     Monthly INR being monitored by  from [facility]    This note was scribed in the presence of Stephanie Saleem MD by Zo Amaro RN.      I, Dr. Melida Mohan, personally performed the services described in this documentation, as scribed by the above

## 2021-11-22 ENCOUNTER — OFFICE VISIT (OUTPATIENT)
Dept: CARDIOLOGY CLINIC | Age: 71
End: 2021-11-22
Payer: MEDICARE

## 2021-11-22 VITALS
HEART RATE: 56 BPM | WEIGHT: 315 LBS | DIASTOLIC BLOOD PRESSURE: 62 MMHG | HEIGHT: 73 IN | OXYGEN SATURATION: 67 % | BODY MASS INDEX: 41.75 KG/M2 | SYSTOLIC BLOOD PRESSURE: 136 MMHG

## 2021-11-22 DIAGNOSIS — I48.3 TYPICAL ATRIAL FLUTTER (HCC): ICD-10-CM

## 2021-11-22 DIAGNOSIS — I48.0 PAROXYSMAL ATRIAL FIBRILLATION (HCC): Primary | ICD-10-CM

## 2021-11-22 DIAGNOSIS — E78.2 MIXED HYPERLIPIDEMIA: ICD-10-CM

## 2021-11-22 DIAGNOSIS — I10 PRIMARY HYPERTENSION: ICD-10-CM

## 2021-11-22 PROCEDURE — 3017F COLORECTAL CA SCREEN DOC REV: CPT | Performed by: INTERNAL MEDICINE

## 2021-11-22 PROCEDURE — 4040F PNEUMOC VAC/ADMIN/RCVD: CPT | Performed by: INTERNAL MEDICINE

## 2021-11-22 PROCEDURE — G8484 FLU IMMUNIZE NO ADMIN: HCPCS | Performed by: INTERNAL MEDICINE

## 2021-11-22 PROCEDURE — 1123F ACP DISCUSS/DSCN MKR DOCD: CPT | Performed by: INTERNAL MEDICINE

## 2021-11-22 PROCEDURE — G8417 CALC BMI ABV UP PARAM F/U: HCPCS | Performed by: INTERNAL MEDICINE

## 2021-11-22 PROCEDURE — G8427 DOCREV CUR MEDS BY ELIG CLIN: HCPCS | Performed by: INTERNAL MEDICINE

## 2021-11-22 PROCEDURE — 99214 OFFICE O/P EST MOD 30 MIN: CPT | Performed by: INTERNAL MEDICINE

## 2021-11-22 PROCEDURE — 1036F TOBACCO NON-USER: CPT | Performed by: INTERNAL MEDICINE

## 2021-11-22 RX ORDER — TAMSULOSIN HYDROCHLORIDE 0.4 MG/1
CAPSULE ORAL
COMMUNITY
Start: 2021-10-01

## 2021-11-22 RX ORDER — FINASTERIDE 5 MG/1
5 TABLET, FILM COATED ORAL DAILY
COMMUNITY
Start: 2021-07-22

## 2021-11-22 NOTE — PATIENT INSTRUCTIONS
Plan:  1. Meds reviewed. Refills with PCP per pt request.   2. Recommend to get new blood pressure cuff ([employer]ron)   -buy one with a large cuff, should cover 80% of arms  3. Check heart rate  4.  Follow up in 1 year

## 2021-12-13 ENCOUNTER — TELEPHONE (OUTPATIENT)
Dept: CARDIOLOGY CLINIC | Age: 71
End: 2021-12-13

## 2023-01-18 ENCOUNTER — OFFICE VISIT (OUTPATIENT)
Dept: CARDIOLOGY CLINIC | Age: 73
End: 2023-01-18

## 2023-01-18 VITALS
OXYGEN SATURATION: 96 % | SYSTOLIC BLOOD PRESSURE: 117 MMHG | HEIGHT: 73 IN | WEIGHT: 315 LBS | DIASTOLIC BLOOD PRESSURE: 70 MMHG | HEART RATE: 59 BPM | BODY MASS INDEX: 41.75 KG/M2

## 2023-01-18 DIAGNOSIS — E78.2 MIXED HYPERLIPIDEMIA: ICD-10-CM

## 2023-01-18 DIAGNOSIS — I48.0 PAROXYSMAL ATRIAL FIBRILLATION (HCC): ICD-10-CM

## 2023-01-18 DIAGNOSIS — I48.3 TYPICAL ATRIAL FLUTTER (HCC): Primary | ICD-10-CM

## 2023-01-18 DIAGNOSIS — I10 PRIMARY HYPERTENSION: ICD-10-CM

## 2023-01-18 RX ORDER — ERGOCALCIFEROL 1.25 MG/1
50000 CAPSULE ORAL WEEKLY
Qty: 4 CAPSULE | Refills: 0
Start: 2023-01-18

## 2023-01-18 RX ORDER — OMEPRAZOLE 20 MG/1
20 CAPSULE, DELAYED RELEASE ORAL DAILY
Qty: 30 CAPSULE | Refills: 0
Start: 2023-01-18

## 2023-01-18 RX ORDER — OMEPRAZOLE 20 MG/1
CAPSULE, DELAYED RELEASE ORAL
COMMUNITY
Start: 2022-12-01 | End: 2023-01-18 | Stop reason: ALTCHOICE

## 2023-01-18 NOTE — PROGRESS NOTES
[facility] Office Note  2023     Subjective:  Mr. Pickens is here for follow up atrial flutter s/p ablation, parox afib.on  coumadin. Mixed hyperlipidemia and hypertension.  C/O easy bruising.      HPI:   Today, he reports feeling fine.  He notes bruising easily.   His pcp manages his coumadin dosing.  Patient denies chest pain, sob, palpitations, dizziness or syncope. He is compliant with cardiac medications.      PMH:  He wore a monitor and on Sept 3, 2015 that noted 1/2 hr of A fib rate 117 and he was aware in.  It lasted about half hr.  He denied chest pain, change in breathing or dizziness. EKG on 2/24/15 showed a flutter. He underwent successful Aflutter ablation on 2015. He is followed by Dr Cadet.    Review of Systems:   12 point ROS negative in all areas as listed below except as in [place]  Constitutional, EENT, pulmonary, GI, , Musculoskeletal, skin, neurological, hematological, endocrine, Psychiatric    Reviewed past medical history, social, and family history.   He stopped smoking 2017. He drinks 2 alcoholic beverages few shots 3-4 nights per week maybe more over the weekend.    Mom smoker  age 74 CHF  Dad  Smoker  age 72 esophageal cancer, aspirated  Past Medical History:   Diagnosis Date    Arthritis     feet    Atrial flutter (HCC)     Bradley's esophagus     Colon polyp     GERD (gastroesophageal reflux disease)     Hiatal hernia     Hyperlipidemia      Past Surgical History:   Procedure Laterality Date    COLONOSCOPY      x 5 since age 48    COLONOSCOPY  2012    COLONOSCOPY  2017    KNEE CARTILAGE SURGERY      left     OTHER SURGICAL HISTORY  12    barretts       Objective:   /70 (Site: Right Upper Arm, Position: Sitting, Cuff Size: Large Adult)   Pulse 59   Ht 6' 1\" (1.854 m)   Wt (!) 324 lb (147 kg)   SpO2 96%   BMI 42.75 kg/m²     Wt Readings from Last 3 Encounters:   23 (!) 324 lb (147 kg)   21 (!) 342 lb (155.1 kg)   20  (!) 330 lb 1.9 oz (149.7 kg)       Physical Exam:  General: No Respiratory distress, appears well developed and well nourished. Eyes:  Sclera nonicteric  Nose/Sinuses:  negative findings: nose shows no deformity, asymmetry, or inflammation, nasal mucosa normal, septum midline with no perforation or bleeding  Back:  no pain to palpation  Joint:  no active joint inflammation  Musculoskeletal:  negative  Skin:  Warm and dry  Neck:  Negative for JVD and Carotid Bruits. Chest:  Clear to auscultation, respiration easy  Cardiovascular:  (S/P ablation 2015) RRR 60 bpm, S2 normal, no murmur, no rub or thrill. Extremities:  Trace pretibial edema,  No clubbing, cyanosis,  Pulses:  pedal pulses are normal.  Neuro: intact    Medications:   Outpatient Encounter Medications as of 1/18/2023   Medication Sig Dispense Refill    diclofenac sodium (VOLTAREN) 1 % GEL Apply 4 g topically 4 times daily      omeprazole (PRILOSEC) 20 MG delayed release capsule Take 1 capsule by mouth Daily 30 capsule 0    vitamin D (ERGOCALCIFEROL) 1.25 MG (31792 UT) CAPS capsule Take 1 capsule by mouth once a week 4 capsule 0    finasteride (PROSCAR) 5 MG tablet Take 5 mg by mouth daily      Inulin (FIBER CHOICE PO) Take by mouth daily      diltiazem (CARDIZEM SR) 120 MG SR capsule Take 120 mg by mouth 2 times daily      metoprolol (LOPRESSOR) 50 MG tablet TAKE ONE-HALF TABLET BY MOUTH TWICE DAILY 30 tablet 5    warfarin (COUMADIN) 2 MG tablet TAKE THREE AND ONE-HALF TABLETS BY MOUTH EVERY DAY OR AS DIRECTED (Patient taking differently: 7 mg TAKE THREE AND ONE-HALF TABLETS BY MOUTH EVERY DAY OR AS DIRECTED) 360 tablet 1    atorvastatin (LIPITOR) 10 MG tablet Take 1 tablet by mouth daily.  30 tablet 11    [DISCONTINUED] omeprazole (PRILOSEC) 20 MG delayed release capsule       [DISCONTINUED] tamsulosin (FLOMAX) 0.4 MG capsule TAKE 1 CAPSULE EVERY DAY (Patient not taking: Reported on 1/18/2023)      [DISCONTINUED] vitamin E 400 UNIT capsule Take 400 Units by mouth daily (Patient not taking: No sig reported)      [DISCONTINUED] vitamin D (CHOLECALCIFEROL) 400 UNITS TABS tablet Take 400 Units by mouth daily      [DISCONTINUED] esomeprazole Magnesium (NEXIUM) 20 MG PACK Take 20 mg by mouth 2 times daily        No facility-administered encounter medications on file as of 1/18/2023. Lab Data:  Lab Results   Component Value Date     11/01/2017    K 4.4 11/01/2017     11/01/2017    CO2 27 11/01/2017    BUN 20 11/01/2017    CREATININE 0.83 11/01/2017    GLUCOSE 108 11/01/2017    CALCIUM 9.5 11/01/2017    PROT 6.9 04/29/2013    LABALBU 4.2 11/01/2017    BILITOT 0.6 11/01/2017    ALKPHOS 47 11/01/2017    AST 21 11/01/2017    ALT 22 11/01/2017    LABGLOM >60 05/01/2015    GFRAA >60 05/01/2015       Lab Results   Component Value Date    WBC 7.4 11/01/2017    HGB 14.7 11/01/2017    HCT 44 11/01/2017    MCV 92 11/01/2017     11/01/2017     No results found for: TSH, F4LIUEB, D1IWLBI, THYROIDAB, FT3, T4FREE    No results found for: TSH, I2EVPUV, J8TNJLP, THYROIDAB, FT3, T4FREE  Lab Results   Component Value Date    CHOL 196 11/01/2017    CHOL 216 (H) 04/29/2013     Lab Results   Component Value Date    TRIG 242 11/01/2017    TRIG 250 (H) 04/29/2013     Lab Results   Component Value Date    HDL 45 11/01/2017    HDL 48 04/29/2013     Lab Results   Component Value Date    LDLCALC 103 11/01/2017    LDLCALC 117 (H) 04/29/2013     Lab Results   Component Value Date    LABVLDL 50 04/29/2013     Lab Results   Component Value Date    CHOLHDLRATIO 4.4 11/01/2017       CARE EVERYWHERE LABS:  8/1/2022 FLP-Chol 167, Trig 215, HDL 53 and LDL 78, AST 19, ALT  17, H/H 14.1/43.0, , K 5.0, BUN/Cr 13/0.94      IMAGING:   EKG 1.18. 23Sinus bradycardia  Complete right bundle branch block.   EKG 5/25/2016  Sinus  Rhythm  -First degree A-V block   [place] = 244  Negative precordial T-waves  -May be normal -consider anteroseptal ischemia  64 bpm      Assessment/Plan:  Encounter Diagnoses   Name Primary? Typical atrial flutter (HCC) Yes    Mixed hyperlipidemia     Primary hypertension     Paroxysmal atrial fibrillation (HCC)          Atrial Fibrillation by History   -currently regular  He is on coumadin. Monitored by PCP at [facility] [place] [place] . Target INR 2-3 avoid aspirin and NSAIDS  -S/p ablation of atrial flutter 5/4/2015  -EKG today sinus   2. Hypertension BP normal    -On Lopressor 75mg BID   3. Hyperlipidemia   -On Atorvastatin 10 mg nightly   -Labs from 8/1/2022 in care everywhere reviewed with patient LDL 78 TG high 215 he is trying to lose weight that would help. 4. Bruising  Checked labs 8.1.22 normal platelets bruising from warfarin only   Plan to follow up in a year        QUALITY MEASURES  1. Tobacco Cessation Counseling: NA  2. Retake of BP if >140/90:   NA  3. Documentation to PCP/referring for new patient:  Sent to PCP at close of office visit  4. CAD patient on anti-platelet: NA  5. CAD patient on STATIN therapy:  Yes  6. Patient with CHF and aFib on anticoagulation:  Yes     Monthly INR being monitored by Dr from [facility]    This note was scribed in the presence of Minor Goins MD by Radha Simpson RN. I, Dr. Marlys Ragsdale, personally performed the services described in this documentation, as scribed by the above signed scribe in my presence. It is both accurate and complete to my knowledge. I agree with the details independently gathered by the clinical support staff, while the remaining scribed note accurately describes my personal service to the patient.       Minor Goins MD  [facility]  [phone] [facility]  [phone] [facility]

## 2023-06-09 ENCOUNTER — HOSPITAL ENCOUNTER (OUTPATIENT)
Age: 73
Discharge: HOME | End: 2023-06-09
Payer: MEDICARE

## 2023-06-09 LAB
BASOPHILS # BLD: 0 K/UL (ref 0–0.2)
BASOPHILS NFR BLD: 0.4 %
DEPRECATED RDW RBC AUTO: 13.7 % (ref 12.4–15.4)
EOSINOPHIL # BLD: 0.1 K/UL (ref 0–0.6)
EOSINOPHIL NFR BLD: 1.9 %
HCT VFR BLD AUTO: 42.5 % (ref 40.5–52.5)
HGB BLD-MCNC: 14.4 G/DL (ref 13.5–17.5)
INR PPP: 1.13 (ref 0.84–1.16)
LYMPHOCYTES # BLD: 1.8 K/UL (ref 1–5.1)
LYMPHOCYTES NFR BLD: 24.5 %
MCH RBC QN AUTO: 32.2 PG (ref 26–34)
MCHC RBC AUTO-ENTMCNC: 33.8 G/DL (ref 31–36)
MCV RBC AUTO: 95.1 FL (ref 80–100)
MONOCYTES # BLD: 0.5 K/UL (ref 0–1.3)
MONOCYTES NFR BLD: 6.8 %
NEUTROPHILS # BLD: 4.9 K/UL (ref 1.7–7.7)
NEUTROPHILS NFR BLD: 66.4 %
PLATELET # BLD AUTO: 185 K/UL (ref 135–450)
PMV BLD AUTO: 8.1 FL (ref 5–10.5)
PROTHROMBIN TIME: 14.5 SEC (ref 11.5–14.8)
RBC # BLD AUTO: 4.47 M/UL (ref 4.2–5.9)
WBC # BLD AUTO: 7.3 K/UL (ref 4–11)

## 2023-06-09 PROCEDURE — 85025 COMPLETE CBC W/AUTO DIFF WBC: CPT

## 2023-06-09 PROCEDURE — 85610 PROTHROMBIN TIME: CPT

## 2024-02-06 NOTE — PROGRESS NOTES
04/29/2013     No results found for: \"VLDL\"    Lab Results   Component Value Date    NASIMA 4.4 11/01/2017       CARE EVERYWHERE LABS:  8/9/23 - , , HDL 58, , A1C 5.6,   , K 4.8, BUN 20, Creat 0.99,         IMAGING:   EKG 2/8/24 - Sinus bradycardia, RBBB   EKG 1.18.23  - Sinus bradycardia  Complete right bundle branch block.  EKG 5/25/2016  Sinus  Rhythm  -First degree A-V block   [place] = 244  Negative precordial T-waves  -May be normal -consider anteroseptal ischemia  64 bpm      Assessment/Plan:  Encounter Diagnoses   Name Primary?    Typical atrial flutter (HCC) Yes    History of atrial fibrillation     Mixed hyperlipidemia     Primary hypertension     Edema of both lower legs due to peripheral venous insufficiency        Atrial Fibrillation by History   -currently regular  He is on coumadin. INR Monitored by PCP at [facility] .    Target INR 2-3 avoid aspirin and NSAIDS  -S/p ablation of atrial flutter 5/4/2015 no recurrence  -EKG today sinus bradycardia   I advised him to stop diltiazem possible cause of edema of legs   increase metoprolol from 25mg BID to 50mg BID  2.   Hypertension BP normal 131/71   - Metoprolol 50mg BID   3.   Hyperlipidemia   -On Rosuvastatin 10 mg nightly   -Labs from 8/1/2022 in care everywhere reviewed with patient LDL 78 TG high            PLAN:  You will stop taking the diltiazem 120 mg completely  Will increase the metoprolol to 50 mg twice daily   Continue all other medications as prescribed   Recommend replacing your blood pressure cuff.  Check your blood pressure and heart rate atleast once a week.  Keep a log.   No cardiac testing warranted at this time      Follow up 1 year     Scribe's attestation:  This note was scribed in the presence of Dr. MONA Luna MD by Megan Mckay, RN  I, Dr. Sudhir Luna, personally performed the services described in this documentation, as scribed by the above signed scribe in my presence. It is both accurate and complete to

## 2024-02-08 ENCOUNTER — OFFICE VISIT (OUTPATIENT)
Dept: CARDIOLOGY CLINIC | Age: 74
End: 2024-02-08
Payer: MEDICARE

## 2024-02-08 VITALS
DIASTOLIC BLOOD PRESSURE: 71 MMHG | BODY MASS INDEX: 42.66 KG/M2 | SYSTOLIC BLOOD PRESSURE: 131 MMHG | OXYGEN SATURATION: 97 % | WEIGHT: 315 LBS | HEIGHT: 72 IN | HEART RATE: 58 BPM

## 2024-02-08 DIAGNOSIS — E78.2 MIXED HYPERLIPIDEMIA: ICD-10-CM

## 2024-02-08 DIAGNOSIS — I48.3 TYPICAL ATRIAL FLUTTER (HCC): Primary | ICD-10-CM

## 2024-02-08 DIAGNOSIS — I87.2 EDEMA OF BOTH LOWER LEGS DUE TO PERIPHERAL VENOUS INSUFFICIENCY: ICD-10-CM

## 2024-02-08 DIAGNOSIS — Z86.79 HISTORY OF ATRIAL FIBRILLATION: ICD-10-CM

## 2024-02-08 DIAGNOSIS — I10 PRIMARY HYPERTENSION: ICD-10-CM

## 2024-02-08 DIAGNOSIS — R60.0 EDEMA OF BOTH LOWER LEGS DUE TO PERIPHERAL VENOUS INSUFFICIENCY: ICD-10-CM

## 2024-02-08 PROCEDURE — G8417 CALC BMI ABV UP PARAM F/U: HCPCS | Performed by: INTERNAL MEDICINE

## 2024-02-08 PROCEDURE — 1123F ACP DISCUSS/DSCN MKR DOCD: CPT | Performed by: INTERNAL MEDICINE

## 2024-02-08 PROCEDURE — 3017F COLORECTAL CA SCREEN DOC REV: CPT | Performed by: INTERNAL MEDICINE

## 2024-02-08 PROCEDURE — G8484 FLU IMMUNIZE NO ADMIN: HCPCS | Performed by: INTERNAL MEDICINE

## 2024-02-08 PROCEDURE — 3078F DIAST BP <80 MM HG: CPT | Performed by: INTERNAL MEDICINE

## 2024-02-08 PROCEDURE — 93000 ELECTROCARDIOGRAM COMPLETE: CPT | Performed by: INTERNAL MEDICINE

## 2024-02-08 PROCEDURE — G8427 DOCREV CUR MEDS BY ELIG CLIN: HCPCS | Performed by: INTERNAL MEDICINE

## 2024-02-08 PROCEDURE — 3075F SYST BP GE 130 - 139MM HG: CPT | Performed by: INTERNAL MEDICINE

## 2024-02-08 PROCEDURE — 1036F TOBACCO NON-USER: CPT | Performed by: INTERNAL MEDICINE

## 2024-02-08 PROCEDURE — 99214 OFFICE O/P EST MOD 30 MIN: CPT | Performed by: INTERNAL MEDICINE

## 2024-02-08 RX ORDER — METOPROLOL TARTRATE 50 MG/1
50 TABLET, FILM COATED ORAL 2 TIMES DAILY
Qty: 90 TABLET | Refills: 2
Start: 2024-02-08

## 2024-02-08 RX ORDER — AZELAIC ACID 0.15 G/G
GEL TOPICAL
COMMUNITY
Start: 2024-01-15

## 2024-02-08 RX ORDER — ROSUVASTATIN CALCIUM 10 MG/1
10 TABLET, COATED ORAL DAILY
COMMUNITY
Start: 2024-01-16

## 2024-02-08 NOTE — PATIENT INSTRUCTIONS
PLAN:  You will stop taking the diltiazem 120 mg completely  Will increase the metoprolol to 50 mg twice daily   Recommend replacing your blood pressure cuff.  Check your blood pressure and heart rate atleast once a week.  Keep a log.   No cardiac testing warranted at this time

## 2024-04-01 ENCOUNTER — TELEPHONE (OUTPATIENT)
Dept: CARDIOLOGY CLINIC | Age: 74
End: 2024-04-01

## 2024-04-01 DIAGNOSIS — R53.83 OTHER FATIGUE: ICD-10-CM

## 2024-04-01 DIAGNOSIS — Z79.899 MEDICATION MANAGEMENT: Primary | ICD-10-CM

## 2024-04-01 RX ORDER — DILTIAZEM HYDROCHLORIDE 120 MG/1
120 TABLET, FILM COATED ORAL 2 TIMES DAILY
COMMUNITY
Start: 2024-03-20

## 2024-04-01 RX ORDER — FUROSEMIDE 20 MG/1
20 TABLET ORAL DAILY
Qty: 60 TABLET | Refills: 3 | Status: SHIPPED | OUTPATIENT
Start: 2024-04-01

## 2024-04-01 NOTE — TELEPHONE ENCOUNTER
PT called and stated his BP is 158/80, PT stated he is experiencing leg swelling and lack of energy. PT would like to know what he should do. PT ph#[phone]

## 2024-04-01 NOTE — TELEPHONE ENCOUNTER
Please advise      PT called and stated his BP is 158/80, PT stated he is experiencing leg swelling and lack of energy. PT would like to know what he should do. PT ph#[phone]

## 2024-04-01 NOTE — TELEPHONE ENCOUNTER
Sudhir Luna MD  P [facility] Staff  Caller: Unspecified (Today,  9:11 AM)  He can go back to his prior medication  Resume diltiazem 120mg daily  Resume prior dose of metoprolol 25mg BID please make changes on med list.

## 2024-04-01 NOTE — TELEPHONE ENCOUNTER
I called patient and he stated that leg swelling is not the major concern.  It's his BP. It going up. He said that this started after medicines were changed after his last OV.  Would like to know if can change meds?           I ordered lasix for daily sent it to [place] pharmacy  After two weeks get blood work orders placed  Have him see me in about 4 weeks.

## 2024-04-02 ENCOUNTER — OFFICE VISIT (OUTPATIENT)
Dept: URGENT CARE | Age: 74
End: 2024-04-02

## 2024-04-02 VITALS
HEART RATE: 59 BPM | SYSTOLIC BLOOD PRESSURE: 143 MMHG | WEIGHT: 315 LBS | TEMPERATURE: 98.3 F | HEIGHT: 72 IN | DIASTOLIC BLOOD PRESSURE: 79 MMHG | BODY MASS INDEX: 42.66 KG/M2 | OXYGEN SATURATION: 93 %

## 2024-04-02 DIAGNOSIS — S61.211A LACERATION OF LEFT INDEX FINGER WITHOUT FOREIGN BODY WITHOUT DAMAGE TO NAIL, INITIAL ENCOUNTER: Primary | ICD-10-CM

## 2024-04-02 DIAGNOSIS — I10 ELEVATED BLOOD PRESSURE READING WITH DIAGNOSIS OF HYPERTENSION: ICD-10-CM

## 2024-04-02 RX ORDER — CEPHALEXIN 500 MG/1
500 CAPSULE ORAL 3 TIMES DAILY
Qty: 21 CAPSULE | Refills: 0 | Status: SHIPPED | OUTPATIENT
Start: 2024-04-02 | End: 2024-04-09

## 2024-04-02 NOTE — PROGRESS NOTES
You Pickens (:  1950) is a 74 y.o. male,New patient, here for evaluation of the following chief complaint(s):  Finger Laceration (Laceration 1st finger left hand)      ASSESSMENT/PLAN:    ICD-10-CM    1. Laceration of left index finger without foreign body without damage to nail, initial encounter  S61.211A Apply steri strips     Tdap, BOOSTRIX, (age 10 yrs+), IM     cephALEXin (KEFLEX) 500 MG capsule     CANCELED: Tdap, BOOSTRIX, (age 10 yrs+), IM      2. Elevated blood pressure reading with diagnosis of hypertension  I10         Keep wound clean and dry-   Ibuprofen/tylenol (if no contraindications) prn pain   Return  anytime if increase in local pain, increase redness or red streak forming or if increase swelling or drainage noted  Take antibiotic if laceration starts looking infected as described above and return to our office for further evaluation  Let steri strip naturally peel off.., return if still attached beyond 10-12 days     Continue to let PCP manage  blood pressure        SUBJECTIVE/OBJECTIVE:  This past  evening cut left index finger with kitchen knife....thought he controlled the bleeding with tape/band aids..has been trying to keep it dry when bathing  Seemed to continue to ooze and today when showering , bleeding started up and would not stop.  Takes coumadin  Last tetanus  2014      History provided by:  Patient   used: No        Vitals:    24 1258   BP: (!) 152/77   Pulse: 59   Temp: 98.3 °F (36.8 °C)   TempSrc: Oral   SpO2: 93%   Weight: (!) 147 kg (324 lb)   Height: 1.829 m (6')       Review of Systems   Skin:  Positive for wound.       Physical Exam    Physical  Vitals signs: reviewed  Constitutional:  appearance: well nourished ..  does not appear acutely ill       Pulmonary/Lungs:  effort normal, no stridor                                 Auscultation: good air movement / breath sounds normal  Cardio-vascular:  normal rate and rhythm

## 2024-04-02 NOTE — PATIENT INSTRUCTIONS
Keep wound clean and dry-   Ibuprofen/tylenol (if no contraindications) prn pain   Return  anytime if increase in local pain, increase redness or red streak forming or if increase swelling or drainage noted  Take antibiotic if laceration starts looking infected as described above and return to our office for further evaluation  Let steri strip naturally peel off.., return if still attached beyond 10-12 days     Continue to let PCP manage  blood pressure

## 2024-05-06 ENCOUNTER — TELEPHONE (OUTPATIENT)
Dept: CARDIOLOGY CLINIC | Age: 74
End: 2024-05-06

## 2024-05-06 NOTE — TELEPHONE ENCOUNTER
LM for pt to see if he can come at 10:30 on 5/7/24 instead of 3:20.  If pt is unable to do this please move to the earliest possible time that he can come tomorrow.  RKG has appt and will not be able to see pt at 3:20.

## 2024-05-06 NOTE — PROGRESS NOTES
[facility] Office Note  2024     Subjective:  Mr. Pickens is here for follow up for hx of atrial fibrillation, aflutter, Mixed hyperlipidemia and hypertension.  S/p ablation of atrial flutter  Dr Cadet. No recurrences.  C/O fatigue not having the same pep.   HPI:    Today he reports doing well. He reports  after stopping diltiazem he had palpitations, swelling, and facial flushing  at the beginning of April. Restarted his diltiazem 120mg daily, and metoprolol 25mg BID. Reports he never picked up the lasix, states \"I urinate just fine and my kidneys are okay I dont need to take a water pill\". Reports having left knee pain. Finished the cortisone shots.  Reports having no energy and increased fatigue. Reports slight chest pain in the evenings, not every day like twinges in chest. . He is able to lay flat at night.  Patient denies current edema, shortness of breath, palpitations, dizziness or syncope.         PMH:  Hyperlipidemia, hypertension, atrial flutter, atrial fibrillation, GERD.   He wore a monitor and on Sept 3, 2015 that noted 1/2 hr of A fib rate 117 and he was aware in.  It lasted about half hr.  He denied chest pain, change in breathing or dizziness. EKG on 2/24/15 showed a flutter. He underwent successful Aflutter ablation on 2015. He is followed by Dr Cadet.  INR is adjusted by his PCP at [facility]     Review of Systems:   12 point ROS negative in all areas as listed below except as in [place]  Constitutional, EENT, pulmonary, GI, , Musculoskeletal, skin, neurological, hematological, endocrine, Psychiatric    Reviewed past medical history, social, and family history.   He stopped smoking 2017. He drinks 2 alcoholic beverages few shots 3-4 nights per week maybe more over the weekend.    Mom smoker  age 74 CHF  Dad  Smoker  age 72 esophageal cancer, aspirated  Past Medical History:   Diagnosis Date    Arthritis     feet    Atrial flutter (HCC)     Bradley's

## 2024-05-07 ENCOUNTER — OFFICE VISIT (OUTPATIENT)
Dept: CARDIOLOGY CLINIC | Age: 74
End: 2024-05-07
Payer: MEDICARE

## 2024-05-07 VITALS
SYSTOLIC BLOOD PRESSURE: 120 MMHG | HEART RATE: 63 BPM | BODY MASS INDEX: 42.66 KG/M2 | OXYGEN SATURATION: 94 % | WEIGHT: 315 LBS | HEIGHT: 72 IN | DIASTOLIC BLOOD PRESSURE: 66 MMHG

## 2024-05-07 DIAGNOSIS — I48.0 PAROXYSMAL ATRIAL FIBRILLATION (HCC): Primary | ICD-10-CM

## 2024-05-07 DIAGNOSIS — R00.2 PALPITATIONS: ICD-10-CM

## 2024-05-07 DIAGNOSIS — I10 PRIMARY HYPERTENSION: ICD-10-CM

## 2024-05-07 DIAGNOSIS — R53.83 OTHER FATIGUE: ICD-10-CM

## 2024-05-07 DIAGNOSIS — E78.2 MIXED HYPERLIPIDEMIA: ICD-10-CM

## 2024-05-07 DIAGNOSIS — I48.3 TYPICAL ATRIAL FLUTTER (HCC): ICD-10-CM

## 2024-05-07 PROCEDURE — 99214 OFFICE O/P EST MOD 30 MIN: CPT | Performed by: INTERNAL MEDICINE

## 2024-05-07 PROCEDURE — 3017F COLORECTAL CA SCREEN DOC REV: CPT | Performed by: INTERNAL MEDICINE

## 2024-05-07 PROCEDURE — 1123F ACP DISCUSS/DSCN MKR DOCD: CPT | Performed by: INTERNAL MEDICINE

## 2024-05-07 PROCEDURE — G8417 CALC BMI ABV UP PARAM F/U: HCPCS | Performed by: INTERNAL MEDICINE

## 2024-05-07 PROCEDURE — 1036F TOBACCO NON-USER: CPT | Performed by: INTERNAL MEDICINE

## 2024-05-07 PROCEDURE — G8427 DOCREV CUR MEDS BY ELIG CLIN: HCPCS | Performed by: INTERNAL MEDICINE

## 2024-05-07 PROCEDURE — 3074F SYST BP LT 130 MM HG: CPT | Performed by: INTERNAL MEDICINE

## 2024-05-07 PROCEDURE — 3078F DIAST BP <80 MM HG: CPT | Performed by: INTERNAL MEDICINE

## 2024-05-07 RX ORDER — DILTIAZEM HYDROCHLORIDE 120 MG/1
120 TABLET, FILM COATED ORAL DAILY
Qty: 90 TABLET | Refills: 3
Start: 2024-05-07

## 2024-05-07 NOTE — PATIENT INSTRUCTIONS
Labs reviewed in epic and discussed with patient.  Medications reviewed in office. Medications refilled as warranted  Cardiac event monitor 48 hours.   Obtain echo, if you can't afford both please just get the echo. This assess heart structure and function  Keep log of your heart rate and blood pressure.       Your provider has ordered testing for further evaluation.  An order/prescription has been included in your paper work.   To schedule outpatient testing, contact Central Scheduling by calling [address] [place] [address] ([phone]).    [facility] [email or web address] imaging [phone]

## 2024-07-16 ENCOUNTER — TELEPHONE (OUTPATIENT)
Dept: CARDIOLOGY CLINIC | Age: 74
End: 2024-07-16

## 2024-07-16 ENCOUNTER — HOSPITAL ENCOUNTER (OUTPATIENT)
Dept: CARDIOLOGY | Age: 74
Discharge: HOME | End: 2024-07-18
Attending: INTERNAL MEDICINE
Payer: MEDICARE

## 2024-07-16 VITALS
BODY MASS INDEX: 42.66 KG/M2 | DIASTOLIC BLOOD PRESSURE: 73 MMHG | WEIGHT: 315 LBS | HEIGHT: 72 IN | SYSTOLIC BLOOD PRESSURE: 152 MMHG

## 2024-07-16 DIAGNOSIS — I48.0 PAROXYSMAL ATRIAL FIBRILLATION (HCC): ICD-10-CM

## 2024-07-16 LAB
ECHO AO ASC DIAM: 4.5 CM
ECHO AO ASCENDING AORTA INDEX: 1.72 CM/M2
ECHO AO ROOT DIAM: 4 CM
ECHO AO ROOT INDEX: 1.53 CM/M2
ECHO BSA: 2.73 M2
ECHO LA AREA 2C: 27.7 CM2
ECHO LA AREA 4C: 29.6 CM2
ECHO LA DIAMETER INDEX: 1.53 CM/M2
ECHO LA DIAMETER: 4 CM
ECHO LA MAJOR AXIS: 6.4 CM
ECHO LA MINOR AXIS: 6.4 CM
ECHO LA TO AORTIC ROOT RATIO: 1
ECHO LA VOL BP: 103 ML (ref 18–58)
ECHO LA VOL MOD A2C: 97 ML (ref 18–58)
ECHO LA VOL MOD A4C: 108 ML (ref 18–58)
ECHO LA VOL/BSA BIPLANE: 39 ML/M2 (ref 16–34)
ECHO LA VOLUME INDEX MOD A2C: 37 ML/M2 (ref 16–34)
ECHO LA VOLUME INDEX MOD A4C: 41 ML/M2 (ref 16–34)
ECHO LV E' LATERAL VELOCITY: 7 CM/S
ECHO LV E' SEPTAL VELOCITY: 7 CM/S
ECHO LV FRACTIONAL SHORTENING: 37 % (ref 28–44)
ECHO LV INTERNAL DIMENSION DIASTOLE INDEX: 1.95 CM/M2
ECHO LV INTERNAL DIMENSION DIASTOLIC: 5.1 CM (ref 4.2–5.9)
ECHO LV INTERNAL DIMENSION SYSTOLIC INDEX: 1.22 CM/M2
ECHO LV INTERNAL DIMENSION SYSTOLIC: 3.2 CM
ECHO LV IVSD: 1 CM (ref 0.6–1)
ECHO LV MASS 2D: 188 G (ref 88–224)
ECHO LV MASS INDEX 2D: 71.8 G/M2 (ref 49–115)
ECHO LV POSTERIOR WALL DIASTOLIC: 1 CM (ref 0.6–1)
ECHO LV RELATIVE WALL THICKNESS RATIO: 0.39
ECHO MV A VELOCITY: 1.18 M/S
ECHO MV E VELOCITY: 0.81 M/S
ECHO MV E/A RATIO: 0.69
ECHO MV E/E' LATERAL: 11.57
ECHO MV E/E' RATIO (AVERAGED): 11.57
ECHO MV E/E' SEPTAL: 11.57

## 2024-07-16 PROCEDURE — 93325 DOPPLER ECHO COLOR FLOW MAPG: CPT | Performed by: INTERNAL MEDICINE

## 2024-07-16 PROCEDURE — 93321 DOPPLER ECHO F-UP/LMTD STD: CPT

## 2024-07-16 PROCEDURE — 93321 DOPPLER ECHO F-UP/LMTD STD: CPT | Performed by: INTERNAL MEDICINE

## 2024-07-16 PROCEDURE — 93308 TTE F-UP OR LMTD: CPT | Performed by: INTERNAL MEDICINE

## 2024-07-16 NOTE — TELEPHONE ENCOUNTER
----- Message from Sudhir Luna MD sent at 7/16/2024  3:18 PM EDT -----  Heart function is good.  Aorta in chest is enlarged. Typically seen with HBP  our goal should be to keep Blood pressure below this and he needs to repeat limited echo in one yr. He should monitor his BP at home at least once a week.  ----- Message -----  From: Doyle Ariza MD  Sent: 7/16/2024   1:10 PM EDT  To: Sudhir Luna MD